# Patient Record
Sex: MALE | Race: WHITE | NOT HISPANIC OR LATINO
[De-identification: names, ages, dates, MRNs, and addresses within clinical notes are randomized per-mention and may not be internally consistent; named-entity substitution may affect disease eponyms.]

---

## 2019-07-08 ENCOUNTER — TRANSCRIPTION ENCOUNTER (OUTPATIENT)
Age: 59
End: 2019-07-08

## 2019-07-09 ENCOUNTER — OUTPATIENT (OUTPATIENT)
Dept: OUTPATIENT SERVICES | Facility: HOSPITAL | Age: 59
LOS: 1 days | End: 2019-07-09
Payer: COMMERCIAL

## 2019-07-09 VITALS
TEMPERATURE: 98 F | DIASTOLIC BLOOD PRESSURE: 77 MMHG | SYSTOLIC BLOOD PRESSURE: 126 MMHG | HEIGHT: 67 IN | RESPIRATION RATE: 23 BRPM | OXYGEN SATURATION: 98 % | WEIGHT: 194.89 LBS | HEART RATE: 86 BPM

## 2019-07-09 VITALS
DIASTOLIC BLOOD PRESSURE: 85 MMHG | HEART RATE: 71 BPM | SYSTOLIC BLOOD PRESSURE: 108 MMHG | RESPIRATION RATE: 15 BRPM | OXYGEN SATURATION: 98 %

## 2019-07-09 DIAGNOSIS — Z98.890 OTHER SPECIFIED POSTPROCEDURAL STATES: Chronic | ICD-10-CM

## 2019-07-09 DIAGNOSIS — H33.012 RETINAL DETACHMENT WITH SINGLE BREAK, LEFT EYE: ICD-10-CM

## 2019-07-09 PROCEDURE — 93005 ELECTROCARDIOGRAM TRACING: CPT

## 2019-07-09 PROCEDURE — C1889: CPT

## 2019-07-09 PROCEDURE — 67108 REPAIR DETACHED RETINA: CPT | Mod: LT

## 2019-07-09 PROCEDURE — 93010 ELECTROCARDIOGRAM REPORT: CPT

## 2019-07-09 NOTE — ASU DISCHARGE PLAN (ADULT/PEDIATRIC) - CALL YOUR DOCTOR IF YOU HAVE ANY OF THE FOLLOWING:
Fever greater than (need to indicate Fahrenheit or Celsius)/Pain not relieved by Medications/Nausea and vomiting that does not stop/Bleeding that does not stop/Swelling that gets worse

## 2019-07-09 NOTE — ASU DISCHARGE PLAN (ADULT/PEDIATRIC) - CARE PROVIDER_API CALL
Herman Srinivasan)  Ophthalmology  [address] [place] [address], [address]  [place] [address]  Phone: [phone]  Fax: [phone]  Follow Up Time:

## 2019-07-09 NOTE — ASU DISCHARGE PLAN (ADULT/PEDIATRIC) - PATIENT EDUCATION MATERIALS PROVIED
Eye shield with instructions , sunglasses and eye kit given to patient./Other (specify)/Provider pre-printed instructions given

## 2019-08-14 ENCOUNTER — TRANSCRIPTION ENCOUNTER (OUTPATIENT)
Age: 59
End: 2019-08-14

## 2019-08-15 ENCOUNTER — OUTPATIENT (OUTPATIENT)
Dept: OUTPATIENT SERVICES | Facility: HOSPITAL | Age: 59
LOS: 1 days | End: 2019-08-15
Payer: COMMERCIAL

## 2019-08-15 VITALS
SYSTOLIC BLOOD PRESSURE: 121 MMHG | HEART RATE: 90 BPM | OXYGEN SATURATION: 100 % | HEIGHT: 67 IN | TEMPERATURE: 99 F | RESPIRATION RATE: 18 BRPM | DIASTOLIC BLOOD PRESSURE: 80 MMHG | WEIGHT: 199.74 LBS

## 2019-08-15 VITALS
SYSTOLIC BLOOD PRESSURE: 108 MMHG | DIASTOLIC BLOOD PRESSURE: 76 MMHG | RESPIRATION RATE: 18 BRPM | HEART RATE: 89 BPM | OXYGEN SATURATION: 96 %

## 2019-08-15 DIAGNOSIS — Z98.890 OTHER SPECIFIED POSTPROCEDURAL STATES: Chronic | ICD-10-CM

## 2019-08-15 DIAGNOSIS — H33.012 RETINAL DETACHMENT WITH SINGLE BREAK, LEFT EYE: ICD-10-CM

## 2019-08-15 DIAGNOSIS — H33.022 RETINAL DETACHMENT WITH MULTIPLE BREAKS, LEFT EYE: ICD-10-CM

## 2019-08-15 PROBLEM — E78.00 PURE HYPERCHOLESTEROLEMIA, UNSPECIFIED: Chronic | Status: ACTIVE | Noted: 2019-07-09

## 2019-08-15 PROBLEM — E03.9 HYPOTHYROIDISM, UNSPECIFIED: Chronic | Status: ACTIVE | Noted: 2019-07-09

## 2019-08-15 PROCEDURE — 67108 REPAIR DETACHED RETINA: CPT | Mod: LT

## 2019-08-15 PROCEDURE — C1814: CPT

## 2019-08-15 PROCEDURE — C1784: CPT

## 2019-08-15 PROCEDURE — C1889: CPT

## 2019-08-15 NOTE — ASU DISCHARGE PLAN (ADULT/PEDIATRIC) - CARE PROVIDER_API CALL
Ruben Serrato (MD)  Ophthalmology  [address] [place] [address], [address]  [place] [address]  Phone: [phone]  Fax: [phone]  Follow Up Time:

## 2019-08-15 NOTE — ASU DISCHARGE PLAN (ADULT/PEDIATRIC) - CALL YOUR DOCTOR IF YOU HAVE ANY OF THE FOLLOWING:
Bleeding that does not stop/Pain not relieved by Medications/Swelling that gets worse/Fever greater than (need to indicate Fahrenheit or Celsius)/Nausea and vomiting that does not stop

## 2019-08-15 NOTE — ASU DISCHARGE PLAN (ADULT/PEDIATRIC) - NURSING INSTRUCTIONS
Do not rub the operative eye   Bring all eye drops to the doctor's office for your follow-up visit   Follow-up tomorrow at 9:30 with Dr. Victoria in [place]

## 2019-08-15 NOTE — ASU PATIENT PROFILE, ADULT - ABILITY TO HEAR (WITH HEARING AID OR HEARING APPLIANCE IF NORMALLY USED):
uses hearing aids at home/Mildly to Moderately Impaired: difficulty hearing in some environments or speaker may need to increase volume or speak distinctly

## 2019-11-12 ENCOUNTER — NON-APPOINTMENT (OUTPATIENT)
Age: 59
End: 2019-11-12

## 2019-11-12 ENCOUNTER — APPOINTMENT (OUTPATIENT)
Dept: OPHTHALMOLOGY | Facility: CLINIC | Age: 59
End: 2019-11-12
Payer: COMMERCIAL

## 2019-11-12 PROCEDURE — 92134 CPTRZ OPH DX IMG PST SGM RTA: CPT

## 2019-11-12 PROCEDURE — 92004 COMPRE OPH EXAM NEW PT 1/>: CPT

## 2019-11-12 PROCEDURE — 92136 OPHTHALMIC BIOMETRY: CPT | Mod: LT

## 2019-11-12 PROCEDURE — 76514 ECHO EXAM OF EYE THICKNESS: CPT

## 2019-12-31 NOTE — ASU PATIENT PROFILE, ADULT - PSH
H/O umbilical hernia repair    History of vitrectomy    S/P trigger finger release H/O umbilical hernia repair    History of vitrectomy  left eye 8/19  S/P trigger finger release

## 2019-12-31 NOTE — ASU PATIENT PROFILE, ADULT - ABILITY TO HEAR (WITH HEARING AID OR HEARING APPLIANCE IF NORMALLY USED):
Mildly to Moderately Impaired: difficulty hearing in some environments or speaker may need to increase volume or speak distinctly/uses hearing aids at home Mildly to Moderately Impaired: difficulty hearing in some environments or speaker may need to increase volume or speak distinctly

## 2019-12-31 NOTE — ASU PATIENT PROFILE, ADULT - HEALTHCARE QUESTIONS, PROFILE
will speak with surgeon & anesthesia prior to surgery Questions addressed prior to procedure with Dr Huang Serrato/Dr. Estrada and anesthesiologist

## 2020-01-01 ENCOUNTER — TRANSCRIPTION ENCOUNTER (OUTPATIENT)
Age: 60
End: 2020-01-01

## 2020-01-02 ENCOUNTER — APPOINTMENT (OUTPATIENT)
Dept: OPHTHALMOLOGY | Facility: HOSPITAL | Age: 60
End: 2020-01-02

## 2020-01-02 ENCOUNTER — NON-APPOINTMENT (OUTPATIENT)
Age: 60
End: 2020-01-02

## 2020-01-02 ENCOUNTER — OUTPATIENT (OUTPATIENT)
Dept: OUTPATIENT SERVICES | Facility: HOSPITAL | Age: 60
LOS: 1 days | Discharge: HOME | End: 2020-01-02
Payer: COMMERCIAL

## 2020-01-02 VITALS
TEMPERATURE: 98 F | SYSTOLIC BLOOD PRESSURE: 124 MMHG | OXYGEN SATURATION: 96 % | WEIGHT: 210.32 LBS | RESPIRATION RATE: 15 BRPM | HEART RATE: 85 BPM | DIASTOLIC BLOOD PRESSURE: 83 MMHG | HEIGHT: 67.25 IN

## 2020-01-02 VITALS
DIASTOLIC BLOOD PRESSURE: 75 MMHG | HEART RATE: 94 BPM | OXYGEN SATURATION: 99 % | RESPIRATION RATE: 18 BRPM | SYSTOLIC BLOOD PRESSURE: 114 MMHG

## 2020-01-02 DIAGNOSIS — Z98.890 OTHER SPECIFIED POSTPROCEDURAL STATES: Chronic | ICD-10-CM

## 2020-01-02 DIAGNOSIS — T85.398A OTHER MECHANICAL COMPLICATION OF OTHER OCULAR PROSTHETIC DEVICES, IMPLANTS AND GRAFTS, INITIAL ENCOUNTER: ICD-10-CM

## 2020-01-02 PROCEDURE — 66984 XCAPSL CTRC RMVL W/O ECP: CPT | Mod: LT

## 2020-01-02 PROCEDURE — V2632: CPT

## 2020-01-02 PROCEDURE — 67036 REMOVAL OF INNER EYE FLUID: CPT | Mod: LT

## 2020-01-02 NOTE — ASU DISCHARGE PLAN (ADULT/PEDIATRIC) - CALL YOUR DOCTOR IF YOU HAVE ANY OF THE FOLLOWING:
Pain not relieved by Medications/Bleeding that does not stop/Fever greater than (need to indicate Fahrenheit or Celsius)/Swelling that gets worse/Wound/Surgical Site with redness, or foul smelling discharge or pus

## 2020-01-02 NOTE — ASU DISCHARGE PLAN (ADULT/PEDIATRIC) - CARE PROVIDER_API CALL
Obdulio Estrada)  Ophthalmology  [address] [place] [address] [address]  [place] [address]  Phone: [phone]  Fax: [phone]  Follow Up Time:

## 2020-01-02 NOTE — ASU DISCHARGE PLAN (ADULT/PEDIATRIC) - PATIENT EDUCATION MATERIALS PROVIED
Intraocular lens implant (IOL) Eye shield with instructions , sunglasses and eye kit given to patient./Other (specify)/Implant card (specify)

## 2020-01-02 NOTE — ASU DISCHARGE PLAN (ADULT/PEDIATRIC) - NURSING INSTRUCTIONS
Do not rub the eye.  Follow up at the office 1/3/20  Dr Estrada [facility] [address]                                   1/7/20 Dr Serrato 1045am

## 2020-01-02 NOTE — ASU DISCHARGE PLAN (ADULT/PEDIATRIC) - PROCEDURE
Left Eye Pars Plana Vitrectomy with SIlicone Oil Removal Cataract Extraction by Phocoemulsification with IOL

## 2020-01-03 ENCOUNTER — APPOINTMENT (OUTPATIENT)
Dept: OPHTHALMOLOGY | Facility: CLINIC | Age: 60
End: 2020-01-03
Payer: COMMERCIAL

## 2020-01-03 ENCOUNTER — NON-APPOINTMENT (OUTPATIENT)
Age: 60
End: 2020-01-03

## 2020-01-03 PROCEDURE — 99024 POSTOP FOLLOW-UP VISIT: CPT

## 2020-01-09 ENCOUNTER — APPOINTMENT (OUTPATIENT)
Dept: OPHTHALMOLOGY | Facility: CLINIC | Age: 60
End: 2020-01-09

## 2020-01-10 ENCOUNTER — NON-APPOINTMENT (OUTPATIENT)
Age: 60
End: 2020-01-10

## 2020-01-10 ENCOUNTER — APPOINTMENT (OUTPATIENT)
Dept: OPHTHALMOLOGY | Facility: CLINIC | Age: 60
End: 2020-01-10
Payer: COMMERCIAL

## 2020-01-10 PROCEDURE — 99024 POSTOP FOLLOW-UP VISIT: CPT

## 2020-01-13 ENCOUNTER — TRANSCRIPTION ENCOUNTER (OUTPATIENT)
Age: 60
End: 2020-01-13

## 2020-01-14 ENCOUNTER — APPOINTMENT (OUTPATIENT)
Dept: OPHTHALMOLOGY | Facility: CLINIC | Age: 60
End: 2020-01-14

## 2020-01-14 ENCOUNTER — OUTPATIENT (OUTPATIENT)
Dept: OUTPATIENT SERVICES | Facility: HOSPITAL | Age: 60
LOS: 1 days | Discharge: HOME | End: 2020-01-14
Payer: COMMERCIAL

## 2020-01-14 VITALS
HEART RATE: 88 BPM | HEIGHT: 67 IN | RESPIRATION RATE: 19 BRPM | TEMPERATURE: 98 F | DIASTOLIC BLOOD PRESSURE: 81 MMHG | SYSTOLIC BLOOD PRESSURE: 121 MMHG | WEIGHT: 209 LBS | OXYGEN SATURATION: 96 %

## 2020-01-14 VITALS
OXYGEN SATURATION: 96 % | SYSTOLIC BLOOD PRESSURE: 119 MMHG | RESPIRATION RATE: 12 BRPM | HEART RATE: 81 BPM | DIASTOLIC BLOOD PRESSURE: 77 MMHG

## 2020-01-14 DIAGNOSIS — Z98.890 OTHER SPECIFIED POSTPROCEDURAL STATES: Chronic | ICD-10-CM

## 2020-01-14 DIAGNOSIS — Z98.49 CATARACT EXTRACTION STATUS, UNSPECIFIED EYE: Chronic | ICD-10-CM

## 2020-01-14 DIAGNOSIS — H33.022 RETINAL DETACHMENT WITH MULTIPLE BREAKS, LEFT EYE: ICD-10-CM

## 2020-01-14 PROCEDURE — C1814: CPT

## 2020-01-14 PROCEDURE — C1889: CPT

## 2020-01-14 PROCEDURE — 67113 REPAIR RETINAL DETACH CPLX: CPT | Mod: LT

## 2020-01-14 NOTE — ASU DISCHARGE PLAN (ADULT/PEDIATRIC) - NURSING INSTRUCTIONS
follow up apt tomorrow 1/15/2020 @  Do not rub the eye follow up apt tomorrow 1/15/2020 @ 9:45am  Do not rub the eye

## 2020-01-14 NOTE — ASU DISCHARGE PLAN (ADULT/PEDIATRIC) - CARE PROVIDER_API CALL
Herman Srinivasan)  Ophthalmology  [address] [place] [address], [address]  [place] [address]  Phone: [phone]  Fax: [phone]  Follow Up Time:

## 2020-01-14 NOTE — ASU PATIENT PROFILE, ADULT - PSH
H/O cataract removal with insertion of prosthetic lens    H/O umbilical hernia repair    History of vitrectomy  left eye 8/19  S/P trigger finger release

## 2020-01-14 NOTE — ASU DISCHARGE PLAN (ADULT/PEDIATRIC) - CALL YOUR DOCTOR IF YOU HAVE ANY OF THE FOLLOWING:
Bleeding that does not stop/Swelling that gets worse/Inability to tolerate liquids or foods/Increased irritability or sluggishness/Pain not relieved by Medications/Fever greater than (need to indicate Fahrenheit or Celsius)/Nausea and vomiting that does not stop

## 2020-01-14 NOTE — ASU DISCHARGE PLAN (ADULT/PEDIATRIC) - PROCEDURE
left eye pars plana vitrectomy, endolaser, gas left eye pars plana vitrectomy, endolaser, membrane peel, 5000 silicone oil

## 2023-01-16 ENCOUNTER — NON-APPOINTMENT (OUTPATIENT)
Age: 63
End: 2023-01-16

## 2023-01-16 ENCOUNTER — APPOINTMENT (OUTPATIENT)
Dept: OPHTHALMOLOGY | Facility: CLINIC | Age: 63
End: 2023-01-16
Payer: COMMERCIAL

## 2023-01-16 PROCEDURE — 92133 CPTRZD OPH DX IMG PST SGM ON: CPT

## 2023-01-16 PROCEDURE — 76514 ECHO EXAM OF EYE THICKNESS: CPT

## 2023-01-16 PROCEDURE — 92004 COMPRE OPH EXAM NEW PT 1/>: CPT

## 2023-01-16 PROCEDURE — 92136 OPHTHALMIC BIOMETRY: CPT

## 2023-01-18 ENCOUNTER — NON-APPOINTMENT (OUTPATIENT)
Age: 63
End: 2023-01-18

## 2023-01-18 ENCOUNTER — APPOINTMENT (OUTPATIENT)
Dept: OPHTHALMOLOGY | Facility: CLINIC | Age: 63
End: 2023-01-18
Payer: COMMERCIAL

## 2023-01-18 PROCEDURE — 92012 INTRM OPH EXAM EST PATIENT: CPT

## 2023-03-01 ENCOUNTER — TRANSCRIPTION ENCOUNTER (OUTPATIENT)
Age: 63
End: 2023-03-01

## 2023-03-02 ENCOUNTER — TRANSCRIPTION ENCOUNTER (OUTPATIENT)
Age: 63
End: 2023-03-02

## 2023-03-02 ENCOUNTER — OUTPATIENT (OUTPATIENT)
Dept: OUTPATIENT SERVICES | Facility: HOSPITAL | Age: 63
LOS: 1 days | Discharge: HOME | End: 2023-03-02
Payer: COMMERCIAL

## 2023-03-02 VITALS
RESPIRATION RATE: 17 BRPM | HEIGHT: 67 IN | WEIGHT: 206.35 LBS | SYSTOLIC BLOOD PRESSURE: 128 MMHG | HEART RATE: 82 BPM | DIASTOLIC BLOOD PRESSURE: 81 MMHG | OXYGEN SATURATION: 97 % | TEMPERATURE: 98 F

## 2023-03-02 VITALS
OXYGEN SATURATION: 98 % | SYSTOLIC BLOOD PRESSURE: 147 MMHG | DIASTOLIC BLOOD PRESSURE: 93 MMHG | HEART RATE: 92 BPM | RESPIRATION RATE: 15 BRPM

## 2023-03-02 DIAGNOSIS — Z90.89 ACQUIRED ABSENCE OF OTHER ORGANS: Chronic | ICD-10-CM

## 2023-03-02 DIAGNOSIS — Z98.890 OTHER SPECIFIED POSTPROCEDURAL STATES: Chronic | ICD-10-CM

## 2023-03-02 DIAGNOSIS — H33.42 TRACTION DETACHMENT OF RETINA, LEFT EYE: ICD-10-CM

## 2023-03-02 DIAGNOSIS — Z98.49 CATARACT EXTRACTION STATUS, UNSPECIFIED EYE: Chronic | ICD-10-CM

## 2023-03-02 PROCEDURE — 67108 REPAIR DETACHED RETINA: CPT | Mod: AS,LT

## 2023-03-02 PROCEDURE — C1889: CPT

## 2023-03-02 PROCEDURE — 66986 EXCHANGE LENS PROSTHESIS: CPT | Mod: LT

## 2023-03-02 PROCEDURE — C1784: CPT

## 2023-03-02 PROCEDURE — V2632: CPT

## 2023-03-02 PROCEDURE — 67108 REPAIR DETACHED RETINA: CPT | Mod: LT

## 2023-03-02 DEVICE — GS C3F8 PERFLUOROPROPANE IOL 2.5 L 20GM: Type: IMPLANTABLE DEVICE | Site: LEFT | Status: FUNCTIONAL

## 2023-03-02 DEVICE — PERFLUORON 7ML KIT: Type: IMPLANTABLE DEVICE | Site: LEFT | Status: FUNCTIONAL

## 2023-03-02 DEVICE — LASER PROBE 25G CONSTELLATION: Type: IMPLANTABLE DEVICE | Site: LEFT | Status: FUNCTIONAL

## 2023-03-02 DEVICE — IMPLANTABLE DEVICE: Type: IMPLANTABLE DEVICE | Site: LEFT | Status: FUNCTIONAL

## 2023-03-02 RX ORDER — LEVOTHYROXINE SODIUM 125 MCG
1 TABLET ORAL
Qty: 0 | Refills: 0 | DISCHARGE

## 2023-03-02 RX ORDER — ROSUVASTATIN CALCIUM 5 MG/1
1 TABLET ORAL
Qty: 0 | Refills: 0 | DISCHARGE

## 2023-03-02 RX ORDER — CHOLECALCIFEROL (VITAMIN D3) 125 MCG
1 CAPSULE ORAL
Qty: 0 | Refills: 0 | DISCHARGE

## 2023-03-02 RX ORDER — TADALAFIL 10 MG/1
1 TABLET, FILM COATED ORAL
Qty: 0 | Refills: 0 | DISCHARGE

## 2023-03-02 NOTE — ASU DISCHARGE PLAN (ADULT/PEDIATRIC) - DIET/FLUID RESTRICTION
Declined 90 day supply of prazosin with Odilia Gray.  Also tried to make sure remaining refills on Lunesta were cancelled, but they were previously transferred to a [facility] in [place]..  Spoke with Odilia on [place]/Michelle and cancelled refills on Lunesta.   Progress slowly

## 2023-03-02 NOTE — ASU DISCHARGE PLAN (ADULT/PEDIATRIC) - NS MD DC FALL RISK RISK
For information on Fall & Injury Prevention, visit: [email or web address] OR  [email or web address] OR  https://www.cdc.gov/steadi/patient.html

## 2023-03-02 NOTE — ASU PATIENT PROFILE, ADULT - FALL HARM RISK - HARM RISK INTERVENTIONS
Communicate Risk of Fall with Harm to all staff/Reinforce activity limits and safety measures with patient and family/Review medications for side effects contributing to fall risk/Tailored Fall Risk Interventions/Visual Cue: Yellow wristband and red socks/Bed in lowest position, wheels locked, appropriate side rails in place/Call bell, personal items and telephone in reach/Instruct patient to call for assistance before getting out of bed or chair/Non-slip footwear when patient is out of bed/[place] to call system/Physically safe environment - no spills, clutter or unnecessary equipment/Purposeful Proactive Rounding/Room/bathroom lighting operational, light cord in reach

## 2023-03-02 NOTE — ASU DISCHARGE PLAN (ADULT/PEDIATRIC) - CARE PROVIDER_API CALL
Yes - the patient is able to be screened Ruben Serrato)  Ophthalmology  [address] [place] [address], [address]  [place] [address]  Phone: [phone]  Fax: [phone]  Scheduled Appointment: 03/03/2023 09:30 AM

## 2023-05-03 ENCOUNTER — APPOINTMENT (OUTPATIENT)
Dept: OPHTHALMOLOGY | Facility: CLINIC | Age: 63
End: 2023-05-03
Payer: COMMERCIAL

## 2023-05-03 ENCOUNTER — NON-APPOINTMENT (OUTPATIENT)
Age: 63
End: 2023-05-03

## 2023-05-03 PROBLEM — H40.9 UNSPECIFIED GLAUCOMA: Chronic | Status: ACTIVE | Noted: 2023-03-02

## 2023-05-03 PROCEDURE — 92012 INTRM OPH EXAM EST PATIENT: CPT

## 2023-05-03 PROCEDURE — 92133 CPTRZD OPH DX IMG PST SGM ON: CPT

## 2023-06-07 ENCOUNTER — NON-APPOINTMENT (OUTPATIENT)
Age: 63
End: 2023-06-07

## 2023-06-07 ENCOUNTER — APPOINTMENT (OUTPATIENT)
Dept: OPHTHALMOLOGY | Facility: CLINIC | Age: 63
End: 2023-06-07
Payer: COMMERCIAL

## 2023-06-07 PROCEDURE — 92012 INTRM OPH EXAM EST PATIENT: CPT

## 2023-10-18 ENCOUNTER — APPOINTMENT (OUTPATIENT)
Dept: OPHTHALMOLOGY | Facility: CLINIC | Age: 63
End: 2023-10-18
Payer: MEDICARE

## 2023-10-18 ENCOUNTER — NON-APPOINTMENT (OUTPATIENT)
Age: 63
End: 2023-10-18

## 2023-10-18 PROCEDURE — 92133 CPTRZD OPH DX IMG PST SGM ON: CPT

## 2023-10-18 PROCEDURE — 92012 INTRM OPH EXAM EST PATIENT: CPT

## 2024-05-15 ENCOUNTER — APPOINTMENT (OUTPATIENT)
Dept: OPHTHALMOLOGY | Facility: CLINIC | Age: 64
End: 2024-05-15

## 2024-07-03 ENCOUNTER — APPOINTMENT (OUTPATIENT)
Dept: OPHTHALMOLOGY | Facility: CLINIC | Age: 64
End: 2024-07-03
Payer: MEDICARE

## 2024-07-03 ENCOUNTER — NON-APPOINTMENT (OUTPATIENT)
Age: 64
End: 2024-07-03

## 2024-07-03 PROCEDURE — 92012 INTRM OPH EXAM EST PATIENT: CPT

## 2024-12-04 ENCOUNTER — NON-APPOINTMENT (OUTPATIENT)
Age: 64
End: 2024-12-04

## 2024-12-04 ENCOUNTER — APPOINTMENT (OUTPATIENT)
Dept: OPHTHALMOLOGY | Facility: CLINIC | Age: 64
End: 2024-12-04
Payer: MEDICARE

## 2024-12-04 PROCEDURE — 92012 INTRM OPH EXAM EST PATIENT: CPT

## 2024-12-04 PROCEDURE — 92133 CPTRZD OPH DX IMG PST SGM ON: CPT

## 2025-01-15 ENCOUNTER — APPOINTMENT (OUTPATIENT)
Dept: OPHTHALMOLOGY | Facility: CLINIC | Age: 65
End: 2025-01-15
Payer: MEDICARE

## 2025-01-15 ENCOUNTER — NON-APPOINTMENT (OUTPATIENT)
Age: 65
End: 2025-01-15

## 2025-01-15 PROCEDURE — 92012 INTRM OPH EXAM EST PATIENT: CPT

## 2025-04-23 ENCOUNTER — APPOINTMENT (OUTPATIENT)
Dept: OPHTHALMOLOGY | Facility: CLINIC | Age: 65
End: 2025-04-23
Payer: MEDICARE

## 2025-04-23 ENCOUNTER — NON-APPOINTMENT (OUTPATIENT)
Age: 65
End: 2025-04-23

## 2025-04-23 PROCEDURE — 92012 INTRM OPH EXAM EST PATIENT: CPT

## 2025-08-20 ENCOUNTER — NON-APPOINTMENT (OUTPATIENT)
Age: 65
End: 2025-08-20

## 2025-08-20 ENCOUNTER — APPOINTMENT (OUTPATIENT)
Dept: OPHTHALMOLOGY | Facility: CLINIC | Age: 65
End: 2025-08-20
Payer: MEDICARE

## 2025-08-20 PROCEDURE — 92012 INTRM OPH EXAM EST PATIENT: CPT

## (undated) DEVICE — LIGHT SHIELD CORNEAL

## (undated) DEVICE — VENODYNE/SCD SLEEVE CALF MEDIUM

## (undated) DEVICE — CONSTELLATION VFC PAK

## (undated) DEVICE — CANNULA SURGICAL SPECIALTIES DUAL BORE 25G

## (undated) DEVICE — CONSTELLATION TOTAL PLUS PAK 23G

## (undated) DEVICE — GLV 7 PROTEXIS (WHITE)

## (undated) DEVICE — DRAPE STERI-DRAPE INCISE 13X13"

## (undated) DEVICE — DRSG MASTISOL

## (undated) DEVICE — SOL IRR BAL SALT + 500ML

## (undated) DEVICE — RETRACTOR SYNERGETICS IRIS FLEXIBLE DISP

## (undated) DEVICE — PACK CONSTELLATION POST 25G 20K

## (undated) DEVICE — PACK VITRECTOMY

## (undated) DEVICE — SUT VICRYL 7-0 12" TG140-8 DA

## (undated) DEVICE — CANNULA MEDONE FLEXTIP 25G

## (undated) DEVICE — WARMING BLANKET LOWER ADULT

## (undated) DEVICE — SYE-LASER - CONSTELLATION MACHINE #2 1003466901X: Type: DURABLE MEDICAL EQUIPMENT

## (undated) DEVICE — NDL HYPO SAFE 22G X 1.5" (BLACK)

## (undated) DEVICE — FORCEP GRIESHABER SERRATED 25G DISP

## (undated) DEVICE — AUTO GAS FILL CONSTELLATION